# Patient Record
(demographics unavailable — no encounter records)

---

## 2018-01-15 NOTE — MISCELLANEOUS
Message   Recorded as Task   Date: 01/28/2016 10:15 AM, Created By: Gladys Soto   Task Name: Call Back   Assigned To: [facility],Team   Regarding Patient: Curry Carrel, Status: Complete   Comment:    Gladys Soto - 28 Jan 2016 10:15 AM     TASK CREATED  I refilled lisinopril for 1 month only  He needs to have lab work done that I ordered and be seen for any further refills  Vicky Osuna - 28 Jan 2016 10:25 AM     TASK EDITED  Spoke to wife  Appt is sched for 2/17/16 w/you  Wife will be in this week to  his lab order  Nataly Graham - 28 Jan 2016 10:42 AM     TASK REPLIED TO: Previously Assigned To Gladys Soto  He was given lab slips at his last appointment  He should have or they will need to be printed out  Vicky Osuna - 28 Jan 2016 10:49 AM     TASK EDITED  Labs were reprinted & placed in folder up front  Vicky Osuna - 28 Jan 2016 10:49 AM     TASK COMPLETED        Active Problems    1  Anxiety (300 00) (F41 9)   2  Hyperglycemia (790 29) (R73 9)   3  Hypertension (401 9) (I10)   4  Long term use of drug (V58 69) (Z79 899)   5  Morbid obesity with BMI of 40 0-44 9, adult (278 01,V85 41) (E66 01,Z68 41)   6  Screening for lipoid disorders (V77 91) (Z13 220)    Current Meds   1  FLUoxetine HCl - 20 MG Oral Capsule (PROzac); take 1 capsule by mouth daily; Therapy: 21Apr2014 to (Evaluate:26Jun2016)  Requested for: [id number]; Last   Rx:28Jan2016 Ordered   2  Lisinopril 10 MG Oral Tablet; take 1 tablet by mouth once daily; Therapy: 21Apr2014 to (Evaluate:[id number])  Requested for: [id number]; Last   Rx:28Jan2016 Ordered    Allergies    1   No Known Drug Allergies    Signatures   Electronically signed by : Lou Bernardo; Mar  7 2016  7:59AM EST                       (Author)

## 2023-09-02 NOTE — MISCELLANEOUS
Provider Comments  Provider Comments:   Patient was a no show for tonight's 7:40pm appt  Signatures   Electronically signed by :  FRANKY Molina; Mar  7 2016  7:56PM EST                       (Author)    Electronically signed by : Shayy Peña MD; Mar  8 2016  7:15AM EST                       (Co-author)
0